# Patient Record
Sex: FEMALE | Race: WHITE | NOT HISPANIC OR LATINO | Employment: OTHER | URBAN - METROPOLITAN AREA
[De-identification: names, ages, dates, MRNs, and addresses within clinical notes are randomized per-mention and may not be internally consistent; named-entity substitution may affect disease eponyms.]

---

## 2023-04-12 ENCOUNTER — OFFICE VISIT (OUTPATIENT)
Dept: PRIMARY CARE | Facility: CLINIC | Age: 68
End: 2023-04-12
Payer: MEDICARE

## 2023-04-12 VITALS
DIASTOLIC BLOOD PRESSURE: 78 MMHG | WEIGHT: 182 LBS | RESPIRATION RATE: 16 BRPM | BODY MASS INDEX: 30.32 KG/M2 | SYSTOLIC BLOOD PRESSURE: 120 MMHG | OXYGEN SATURATION: 97 % | HEART RATE: 60 BPM | HEIGHT: 65 IN

## 2023-04-12 DIAGNOSIS — Z01.818 PREOPERATIVE CLEARANCE: Primary | ICD-10-CM

## 2023-04-12 PROCEDURE — 99213 OFFICE O/P EST LOW 20 MIN: CPT | Performed by: INTERNAL MEDICINE

## 2023-04-12 PROCEDURE — 1036F TOBACCO NON-USER: CPT | Performed by: INTERNAL MEDICINE

## 2023-04-12 PROCEDURE — 1159F MED LIST DOCD IN RCRD: CPT | Performed by: INTERNAL MEDICINE

## 2023-04-12 PROCEDURE — 1160F RVW MEDS BY RX/DR IN RCRD: CPT | Performed by: INTERNAL MEDICINE

## 2023-04-12 RX ORDER — MECLIZINE HYDROCHLORIDE 25 MG/1
TABLET ORAL
COMMUNITY
Start: 2023-02-12 | End: 2023-04-12 | Stop reason: ALTCHOICE

## 2023-04-12 RX ORDER — HYDROCHLOROTHIAZIDE 12.5 MG/1
12.5 CAPSULE ORAL DAILY
COMMUNITY
Start: 2022-08-24 | End: 2023-04-12 | Stop reason: ALTCHOICE

## 2023-04-12 RX ORDER — PAROXETINE HYDROCHLORIDE 20 MG/1
1 TABLET, FILM COATED ORAL DAILY
COMMUNITY
Start: 2022-08-01

## 2023-04-12 RX ORDER — CHOLECALCIFEROL (VITAMIN D3) 125 MCG
5000 CAPSULE ORAL
COMMUNITY
Start: 2019-07-01 | End: 2023-04-12 | Stop reason: ALTCHOICE

## 2023-04-12 RX ORDER — LEVOTHYROXINE SODIUM 50 UG/1
TABLET ORAL
COMMUNITY
Start: 2013-12-05 | End: 2023-04-25

## 2023-04-12 RX ORDER — VALSARTAN 160 MG/1
1 TABLET ORAL DAILY
COMMUNITY
Start: 2022-05-25 | End: 2023-07-20 | Stop reason: SDUPTHER

## 2023-04-12 RX ORDER — FUROSEMIDE 20 MG/1
1 TABLET ORAL DAILY
COMMUNITY
Start: 2022-09-30 | End: 2023-09-25

## 2023-04-12 RX ORDER — CITALOPRAM 20 MG/1
20 TABLET, FILM COATED ORAL
COMMUNITY
Start: 2019-06-06 | End: 2023-04-12 | Stop reason: ALTCHOICE

## 2023-04-12 ASSESSMENT — PATIENT HEALTH QUESTIONNAIRE - PHQ9
2. FEELING DOWN, DEPRESSED OR HOPELESS: NOT AT ALL
1. LITTLE INTEREST OR PLEASURE IN DOING THINGS: NOT AT ALL
SUM OF ALL RESPONSES TO PHQ9 QUESTIONS 1 AND 2: 0

## 2023-04-12 NOTE — PROGRESS NOTES
"Subjective   Patient ID: Karen Cifuentes is a 67 y.o. female who presents for Follow-up. Medical clearance for eyelids     HPI   Patienbt presents for preoperative clearance, eyelid surgery 5/3/23 with Dr. Brooks   No CP, SOB, LE edema  Is able to go up wo flight so stairs without HAWKINS  No previous reactions to anesthesia  Cardiac hx: HTN  Pulm hx: None, never a smoker     Review of Systems   All other systems reviewed and are negative.      Objective   /78   Pulse 60   Ht 1.638 m (5' 4.5\")   Wt 82.6 kg (182 lb)   SpO2 97%   BMI 30.76 kg/m²     Physical Exam  Constitutional:       Appearance: Normal appearance.   HENT:      Head: Normocephalic and atraumatic.   Cardiovascular:      Rate and Rhythm: Normal rate and regular rhythm.      Heart sounds: Normal heart sounds. No murmur heard.     No gallop.   Pulmonary:      Effort: Pulmonary effort is normal. No respiratory distress.      Breath sounds: No wheezing or rales.   Skin:     General: Skin is warm and dry.      Findings: No rash.   Neurological:      Mental Status: She is alert and oriented to person, place, and time. Mental status is at baseline.   Psychiatric:         Mood and Affect: Mood normal.         Behavior: Behavior normal.         Assessment/Plan     #Preoperative clearance   -Patient is cleared for surgery        "

## 2023-06-08 DIAGNOSIS — E03.9 HYPOTHYROIDISM, UNSPECIFIED TYPE: ICD-10-CM

## 2023-06-10 RX ORDER — LEVOTHYROXINE SODIUM 50 UG/1
50 TABLET ORAL
Qty: 90 TABLET | Refills: 1 | Status: SHIPPED | OUTPATIENT
Start: 2023-06-10 | End: 2023-06-14 | Stop reason: ALTCHOICE

## 2023-06-12 DIAGNOSIS — E03.9 HYPOTHYROIDISM, UNSPECIFIED TYPE: ICD-10-CM

## 2023-06-12 LAB
ALANINE AMINOTRANSFERASE (SGPT) (U/L) IN SER/PLAS: 34 U/L (ref 7–45)
ALBUMIN (G/DL) IN SER/PLAS: 4.2 G/DL (ref 3.4–5)
ALKALINE PHOSPHATASE (U/L) IN SER/PLAS: 80 U/L (ref 33–136)
ANION GAP IN SER/PLAS: 12 MMOL/L (ref 10–20)
ASPARTATE AMINOTRANSFERASE (SGOT) (U/L) IN SER/PLAS: 37 U/L (ref 9–39)
BILIRUBIN TOTAL (MG/DL) IN SER/PLAS: 0.5 MG/DL (ref 0–1.2)
CALCIDIOL (25 OH VITAMIN D3) (NG/ML) IN SER/PLAS: 24 NG/ML
CALCIUM (MG/DL) IN SER/PLAS: 9.8 MG/DL (ref 8.6–10.3)
CARBON DIOXIDE, TOTAL (MMOL/L) IN SER/PLAS: 30 MMOL/L (ref 21–32)
CHLORIDE (MMOL/L) IN SER/PLAS: 103 MMOL/L (ref 98–107)
CHOLESTEROL (MG/DL) IN SER/PLAS: 224 MG/DL (ref 0–199)
CHOLESTEROL IN HDL (MG/DL) IN SER/PLAS: 83 MG/DL
CHOLESTEROL/HDL RATIO: 2.7
CREATININE (MG/DL) IN SER/PLAS: 0.98 MG/DL (ref 0.5–1.05)
ERYTHROCYTE DISTRIBUTION WIDTH (RATIO) BY AUTOMATED COUNT: 12.9 % (ref 11.5–14.5)
ERYTHROCYTE MEAN CORPUSCULAR HEMOGLOBIN CONCENTRATION (G/DL) BY AUTOMATED: 32 G/DL (ref 32–36)
ERYTHROCYTE MEAN CORPUSCULAR VOLUME (FL) BY AUTOMATED COUNT: 94 FL (ref 80–100)
ERYTHROCYTES (10*6/UL) IN BLOOD BY AUTOMATED COUNT: 4.54 X10E12/L (ref 4–5.2)
GFR FEMALE: 63 ML/MIN/1.73M2
GLUCOSE (MG/DL) IN SER/PLAS: 83 MG/DL (ref 74–99)
HEMATOCRIT (%) IN BLOOD BY AUTOMATED COUNT: 42.5 % (ref 36–46)
HEMOGLOBIN (G/DL) IN BLOOD: 13.6 G/DL (ref 12–16)
LDL: 127 MG/DL (ref 0–99)
LEUKOCYTES (10*3/UL) IN BLOOD BY AUTOMATED COUNT: 6.6 X10E9/L (ref 4.4–11.3)
PLATELETS (10*3/UL) IN BLOOD AUTOMATED COUNT: 295 X10E9/L (ref 150–450)
POTASSIUM (MMOL/L) IN SER/PLAS: 4.8 MMOL/L (ref 3.5–5.3)
PROTEIN TOTAL: 7.1 G/DL (ref 6.4–8.2)
SODIUM (MMOL/L) IN SER/PLAS: 140 MMOL/L (ref 136–145)
THYROTROPIN (MIU/L) IN SER/PLAS BY DETECTION LIMIT <= 0.05 MIU/L: 4.3 MIU/L (ref 0.44–3.98)
TRIGLYCERIDE (MG/DL) IN SER/PLAS: 69 MG/DL (ref 0–149)
UREA NITROGEN (MG/DL) IN SER/PLAS: 12 MG/DL (ref 6–23)
VLDL: 14 MG/DL (ref 0–40)

## 2023-06-12 NOTE — TELEPHONE ENCOUNTER
Requested Prescriptions     Pending Prescriptions Disp Refills    levothyroxine (Synthroid, Levoxyl) 50 mcg tablet 10 tablet 0     Sig: Take 1 tablet (50 mcg) by mouth once daily in the morning. Take before meals. TAKE ON AN EMPTY STOMACH    Short supply

## 2023-06-14 DIAGNOSIS — E03.9 HYPOTHYROIDISM, UNSPECIFIED TYPE: Primary | ICD-10-CM

## 2023-06-14 RX ORDER — LEVOTHYROXINE SODIUM 50 UG/1
50 TABLET ORAL SEE ADMIN INSTRUCTIONS
Qty: 96 TABLET | Refills: 1 | Status: SHIPPED | OUTPATIENT
Start: 2023-06-14 | End: 2023-12-20

## 2023-06-15 RX ORDER — LEVOTHYROXINE SODIUM 50 UG/1
50 TABLET ORAL
Qty: 10 TABLET | Refills: 0 | OUTPATIENT
Start: 2023-06-15 | End: 2023-09-13

## 2023-07-20 DIAGNOSIS — I10 HYPERTENSION, UNSPECIFIED TYPE: ICD-10-CM

## 2023-07-20 PROBLEM — E78.5 HYPERLIPIDEMIA: Status: ACTIVE | Noted: 2023-07-20

## 2023-07-20 PROBLEM — R35.89 POLYURIA: Status: ACTIVE | Noted: 2023-07-20

## 2023-07-20 PROBLEM — R11.0 NAUSEA IN ADULT: Status: ACTIVE | Noted: 2023-07-20

## 2023-07-20 PROBLEM — N89.8 VAGINAL ITCHING: Status: ACTIVE | Noted: 2023-07-20

## 2023-07-20 PROBLEM — I31.39 PERICARDIAL EFFUSION (HHS-HCC): Status: ACTIVE | Noted: 2018-04-16

## 2023-07-20 PROBLEM — F43.21 ADJUSTMENT DISORDER WITH DEPRESSED MOOD: Status: ACTIVE | Noted: 2023-07-20

## 2023-07-20 PROBLEM — N76.0 VAGINITIS: Status: ACTIVE | Noted: 2023-07-20

## 2023-07-20 PROBLEM — M81.0 POSTMENOPAUSAL BONE LOSS: Status: ACTIVE | Noted: 2023-07-20

## 2023-07-20 PROBLEM — T14.8XXA WOUND INFECTION: Status: ACTIVE | Noted: 2023-07-20

## 2023-07-20 PROBLEM — E55.9 VITAMIN D DEFICIENCY: Status: ACTIVE | Noted: 2023-07-20

## 2023-07-20 PROBLEM — R63.4 UNINTENTIONAL WEIGHT LOSS: Status: ACTIVE | Noted: 2023-07-20

## 2023-07-20 PROBLEM — R92.8 ABNORMAL MAMMOGRAM: Status: ACTIVE | Noted: 2023-07-20

## 2023-07-20 PROBLEM — K59.00 CONSTIPATION: Status: ACTIVE | Noted: 2023-07-20

## 2023-07-20 PROBLEM — I49.9 ARRHYTHMIA: Status: ACTIVE | Noted: 2023-07-20

## 2023-07-20 PROBLEM — E03.9 PRIMARY HYPOTHYROIDISM: Status: ACTIVE | Noted: 2018-04-16

## 2023-07-20 PROBLEM — L08.9 WOUND INFECTION: Status: ACTIVE | Noted: 2023-07-20

## 2023-07-20 PROBLEM — M85.80 OSTEOPENIA: Status: ACTIVE | Noted: 2023-07-20

## 2023-07-20 PROBLEM — I35.8 AORTIC DIASTOLIC MURMUR: Status: ACTIVE | Noted: 2023-07-20

## 2023-07-20 PROBLEM — H93.8X2 SENSATION OF PLUGGED EAR ON LEFT SIDE: Status: ACTIVE | Noted: 2023-07-20

## 2023-07-20 PROBLEM — E83.52 HYPERCALCEMIA: Status: ACTIVE | Noted: 2023-07-20

## 2023-07-20 PROBLEM — F52.0 HYPOACTIVE SEXUAL DESIRE DISORDER: Status: ACTIVE | Noted: 2023-07-20

## 2023-07-20 PROBLEM — N76.2 VULVITIS: Status: ACTIVE | Noted: 2023-07-20

## 2023-07-20 PROBLEM — N95.1 SYMPTOMATIC MENOPAUSAL OR FEMALE CLIMACTERIC STATES: Status: ACTIVE | Noted: 2019-07-01

## 2023-07-20 PROBLEM — N94.10 DYSPAREUNIA IN FEMALE: Status: ACTIVE | Noted: 2023-07-20

## 2023-07-20 PROBLEM — E03.9 HYPOTHYROIDISM (ACQUIRED): Status: ACTIVE | Noted: 2023-07-20

## 2023-07-20 PROBLEM — N95.2 VAGINAL ATROPHY: Status: ACTIVE | Noted: 2023-07-20

## 2023-07-20 PROBLEM — K13.0 ANGULAR CHEILOSIS: Status: ACTIVE | Noted: 2023-07-20

## 2023-07-20 NOTE — TELEPHONE ENCOUNTER
Requested Prescriptions     Pending Prescriptions Disp Refills    valsartan (Diovan) 160 mg tablet 90 tablet 1     Sig: Take 1 tablet (160 mg) by mouth once daily.

## 2023-07-21 RX ORDER — VALSARTAN 160 MG/1
160 TABLET ORAL DAILY
Qty: 90 TABLET | Refills: 1 | Status: SHIPPED | OUTPATIENT
Start: 2023-07-21 | End: 2023-11-09 | Stop reason: SDUPTHER

## 2023-09-25 DIAGNOSIS — I10 BENIGN ESSENTIAL HYPERTENSION: Primary | ICD-10-CM

## 2023-09-25 RX ORDER — FUROSEMIDE 20 MG/1
20 TABLET ORAL DAILY
Qty: 90 TABLET | Refills: 3 | Status: SHIPPED | OUTPATIENT
Start: 2023-09-25 | End: 2023-11-17 | Stop reason: ALTCHOICE

## 2023-11-02 ENCOUNTER — OFFICE VISIT (OUTPATIENT)
Dept: PRIMARY CARE | Facility: CLINIC | Age: 68
End: 2023-11-02
Payer: MEDICARE

## 2023-11-02 VITALS
OXYGEN SATURATION: 95 % | BODY MASS INDEX: 32.95 KG/M2 | HEART RATE: 66 BPM | SYSTOLIC BLOOD PRESSURE: 156 MMHG | DIASTOLIC BLOOD PRESSURE: 83 MMHG | WEIGHT: 195 LBS

## 2023-11-02 DIAGNOSIS — R05.1 ACUTE COUGH: ICD-10-CM

## 2023-11-02 DIAGNOSIS — K21.9 GASTROESOPHAGEAL REFLUX DISEASE, UNSPECIFIED WHETHER ESOPHAGITIS PRESENT: Primary | ICD-10-CM

## 2023-11-02 PROCEDURE — 1159F MED LIST DOCD IN RCRD: CPT | Performed by: INTERNAL MEDICINE

## 2023-11-02 PROCEDURE — 1160F RVW MEDS BY RX/DR IN RCRD: CPT | Performed by: INTERNAL MEDICINE

## 2023-11-02 PROCEDURE — 3079F DIAST BP 80-89 MM HG: CPT | Performed by: INTERNAL MEDICINE

## 2023-11-02 PROCEDURE — 1036F TOBACCO NON-USER: CPT | Performed by: INTERNAL MEDICINE

## 2023-11-02 PROCEDURE — 99213 OFFICE O/P EST LOW 20 MIN: CPT | Performed by: INTERNAL MEDICINE

## 2023-11-02 PROCEDURE — 3077F SYST BP >= 140 MM HG: CPT | Performed by: INTERNAL MEDICINE

## 2023-11-02 RX ORDER — OMEPRAZOLE 40 MG/1
40 CAPSULE, DELAYED RELEASE ORAL
Qty: 30 CAPSULE | Refills: 1 | Status: SHIPPED | OUTPATIENT
Start: 2023-11-02

## 2023-11-02 NOTE — PROGRESS NOTES
Subjective   Patient ID: Karen Cifuentes is a 68 y.o. female who presents for Cough.    HPI     Reports nasal drainage and cough that started a few weeks ago. Took PO steroid which as not helpful   Still has dry cough , but drinage is better  GERD is worse recently, takes Tums recently  No fever or chills       Review of Systems   All other systems reviewed and are negative.      Objective   Wt 88.5 kg (195 lb)   BMI 32.95 kg/m²     Physical Exam  Constitutional:       Appearance: Normal appearance.   HENT:      Head: Normocephalic and atraumatic.   Cardiovascular:      Rate and Rhythm: Normal rate and regular rhythm.      Heart sounds: Normal heart sounds. No murmur heard.     No gallop.   Pulmonary:      Effort: Pulmonary effort is normal. No respiratory distress.      Breath sounds: No wheezing or rales.   Skin:     General: Skin is warm and dry.      Findings: No rash.   Neurological:      Mental Status: She is alert and oriented to person, place, and time. Mental status is at baseline.   Psychiatric:         Mood and Affect: Mood normal.         Behavior: Behavior normal.         Assessment/Plan       #Cough   -Likely due to GERD, possible PND component   -Rx omeprazole 40mg daily   -OTC Flonase for PND component

## 2023-11-02 NOTE — PATIENT INSTRUCTIONS
General: Take omprazole first in am on empty stomach  Can try Flonase as well for post nasal drip component     Call me in 2 weeks if cough not improving

## 2023-11-09 DIAGNOSIS — I10 HYPERTENSION, UNSPECIFIED TYPE: ICD-10-CM

## 2023-11-09 RX ORDER — VALSARTAN 160 MG/1
160 TABLET ORAL DAILY
Qty: 30 TABLET | Refills: 0 | Status: SHIPPED | OUTPATIENT
Start: 2023-11-09 | End: 2024-02-22 | Stop reason: SDUPTHER

## 2023-11-09 NOTE — TELEPHONE ENCOUNTER
Requested Prescriptions     Pending Prescriptions Disp Refills    valsartan (Diovan) 160 mg tablet 30 tablet 0     Sig: Take 1 tablet (160 mg) by mouth once daily. Short day supply

## 2023-11-17 ENCOUNTER — OFFICE VISIT (OUTPATIENT)
Dept: OBSTETRICS AND GYNECOLOGY | Facility: CLINIC | Age: 68
End: 2023-11-17
Payer: MEDICARE

## 2023-11-17 VITALS
DIASTOLIC BLOOD PRESSURE: 80 MMHG | BODY MASS INDEX: 31.82 KG/M2 | HEIGHT: 65 IN | WEIGHT: 191 LBS | SYSTOLIC BLOOD PRESSURE: 158 MMHG

## 2023-11-17 DIAGNOSIS — Z01.419 PAP TEST, AS PART OF ROUTINE GYNECOLOGICAL EXAMINATION: Primary | ICD-10-CM

## 2023-11-17 DIAGNOSIS — Z12.31 BREAST CANCER SCREENING BY MAMMOGRAM: ICD-10-CM

## 2023-11-17 PROCEDURE — 87624 HPV HI-RISK TYP POOLED RSLT: CPT

## 2023-11-17 PROCEDURE — 99214 OFFICE O/P EST MOD 30 MIN: CPT | Performed by: OBSTETRICS & GYNECOLOGY

## 2023-11-17 PROCEDURE — 1159F MED LIST DOCD IN RCRD: CPT | Performed by: OBSTETRICS & GYNECOLOGY

## 2023-11-17 PROCEDURE — 1160F RVW MEDS BY RX/DR IN RCRD: CPT | Performed by: OBSTETRICS & GYNECOLOGY

## 2023-11-17 PROCEDURE — 3079F DIAST BP 80-89 MM HG: CPT | Performed by: OBSTETRICS & GYNECOLOGY

## 2023-11-17 PROCEDURE — 3077F SYST BP >= 140 MM HG: CPT | Performed by: OBSTETRICS & GYNECOLOGY

## 2023-11-17 PROCEDURE — 1036F TOBACCO NON-USER: CPT | Performed by: OBSTETRICS & GYNECOLOGY

## 2023-11-17 PROCEDURE — 1126F AMNT PAIN NOTED NONE PRSNT: CPT | Performed by: OBSTETRICS & GYNECOLOGY

## 2023-11-17 PROCEDURE — 88175 CYTOPATH C/V AUTO FLUID REDO: CPT

## 2023-11-17 ASSESSMENT — ENCOUNTER SYMPTOMS
MUSCULOSKELETAL NEGATIVE: 1
PSYCHIATRIC NEGATIVE: 1
CARDIOVASCULAR NEGATIVE: 1
NEUROLOGICAL NEGATIVE: 1
EYES NEGATIVE: 1
ALLERGIC/IMMUNOLOGIC NEGATIVE: 1
GASTROINTESTINAL NEGATIVE: 1
HEMATOLOGIC/LYMPHATIC NEGATIVE: 1
RESPIRATORY NEGATIVE: 1
ENDOCRINE NEGATIVE: 1
CONSTITUTIONAL NEGATIVE: 1

## 2023-11-17 ASSESSMENT — PAIN SCALES - GENERAL: PAINLEVEL: 0-NO PAIN

## 2023-11-17 NOTE — PROGRESS NOTES
Subjective   Patient ID: Karen Cifuentes is a 68 y.o. female who presents for Annual Exam (Last pap: 7/7/201 NEG/Last mamm: 11/3/2022 NEG CAT 1).  HPI patient is here for annual visit she has no complaints    Review of Systems   Constitutional: Negative.    Eyes: Negative.    Respiratory: Negative.     Cardiovascular: Negative.    Gastrointestinal: Negative.    Endocrine: Negative.    Genitourinary: Negative.    Musculoskeletal: Negative.    Skin: Negative.    Allergic/Immunologic: Negative.    Neurological: Negative.    Hematological: Negative.    Psychiatric/Behavioral: Negative.         Objective   Physical Exam  Constitutional:       Appearance: Normal appearance. She is obese.   HENT:      Head: Normocephalic and atraumatic.   Cardiovascular:      Rate and Rhythm: Normal rate and regular rhythm.      Pulses: Normal pulses.      Heart sounds: Normal heart sounds.   Pulmonary:      Effort: Pulmonary effort is normal.      Breath sounds: Normal breath sounds.   Abdominal:      General: Abdomen is flat. Bowel sounds are normal.      Palpations: Abdomen is soft.      Hernia: There is no hernia in the left inguinal area or right inguinal area.   Genitourinary:     General: Normal vulva.      Exam position: Lithotomy position.      Labia:         Right: No rash, tenderness or lesion.         Left: No rash, tenderness or lesion.       Urethra: No prolapse.      Vagina: Normal.      Cervix: Normal.      Uterus: Normal.       Adnexa: Right adnexa normal and left adnexa normal.   Musculoskeletal:      Cervical back: Normal range of motion and neck supple.   Skin:     General: Skin is warm and dry.   Neurological:      General: No focal deficit present.      Mental Status: She is alert and oriented to person, place, and time.         Assessment/Plan    normal postmenopausal gynecologic examination  Mammogram

## 2023-12-19 DIAGNOSIS — E03.9 HYPOTHYROIDISM, UNSPECIFIED TYPE: ICD-10-CM

## 2023-12-20 RX ORDER — LEVOTHYROXINE SODIUM 50 UG/1
50 TABLET ORAL
Qty: 30 TABLET | Refills: 11 | Status: SHIPPED | OUTPATIENT
Start: 2023-12-20 | End: 2023-12-27 | Stop reason: SDUPTHER

## 2023-12-27 DIAGNOSIS — E03.9 HYPOTHYROIDISM, UNSPECIFIED TYPE: ICD-10-CM

## 2023-12-27 RX ORDER — LEVOTHYROXINE SODIUM 50 UG/1
50 TABLET ORAL
Qty: 90 TABLET | Refills: 0 | Status: SHIPPED | OUTPATIENT
Start: 2023-12-27 | End: 2024-01-18 | Stop reason: SDUPTHER

## 2023-12-27 NOTE — TELEPHONE ENCOUNTER
Requested Prescriptions     Pending Prescriptions Disp Refills    levothyroxine (Synthroid, Levoxyl) 50 mcg tablet 90 tablet 0     Sig: Take 1 tablet (50 mcg) by mouth once daily in the morning. Take before meals. TAKE ON AN EMPTY STOMACH

## 2024-01-18 DIAGNOSIS — E03.9 HYPOTHYROIDISM, UNSPECIFIED TYPE: ICD-10-CM

## 2024-01-19 RX ORDER — LEVOTHYROXINE SODIUM 50 UG/1
50 TABLET ORAL
Qty: 90 TABLET | Refills: 0 | Status: SHIPPED | OUTPATIENT
Start: 2024-01-19 | End: 2024-05-22 | Stop reason: SDUPTHER

## 2024-02-21 ENCOUNTER — HOSPITAL ENCOUNTER (OUTPATIENT)
Dept: RADIOLOGY | Facility: HOSPITAL | Age: 69
Discharge: HOME | End: 2024-02-21
Payer: MEDICARE

## 2024-02-21 DIAGNOSIS — Z12.31 BREAST CANCER SCREENING BY MAMMOGRAM: ICD-10-CM

## 2024-02-21 PROCEDURE — 77067 SCR MAMMO BI INCL CAD: CPT | Performed by: RADIOLOGY

## 2024-02-21 PROCEDURE — 77063 BREAST TOMOSYNTHESIS BI: CPT

## 2024-02-21 PROCEDURE — 77063 BREAST TOMOSYNTHESIS BI: CPT | Performed by: RADIOLOGY

## 2024-02-22 ENCOUNTER — OFFICE VISIT (OUTPATIENT)
Dept: CARDIOLOGY | Facility: HOSPITAL | Age: 69
End: 2024-02-22
Payer: MEDICARE

## 2024-02-22 VITALS
OXYGEN SATURATION: 96 % | SYSTOLIC BLOOD PRESSURE: 173 MMHG | HEART RATE: 78 BPM | BODY MASS INDEX: 27.62 KG/M2 | DIASTOLIC BLOOD PRESSURE: 101 MMHG | WEIGHT: 166.01 LBS

## 2024-02-22 DIAGNOSIS — I10 HYPERTENSION, UNSPECIFIED TYPE: ICD-10-CM

## 2024-02-22 DIAGNOSIS — I10 PRIMARY HYPERTENSION: Primary | ICD-10-CM

## 2024-02-22 PROCEDURE — 3077F SYST BP >= 140 MM HG: CPT | Performed by: NURSE PRACTITIONER

## 2024-02-22 PROCEDURE — 99214 OFFICE O/P EST MOD 30 MIN: CPT | Performed by: NURSE PRACTITIONER

## 2024-02-22 PROCEDURE — 99204 OFFICE O/P NEW MOD 45 MIN: CPT | Performed by: NURSE PRACTITIONER

## 2024-02-22 PROCEDURE — 1159F MED LIST DOCD IN RCRD: CPT | Performed by: NURSE PRACTITIONER

## 2024-02-22 PROCEDURE — 93005 ELECTROCARDIOGRAM TRACING: CPT | Performed by: NURSE PRACTITIONER

## 2024-02-22 PROCEDURE — 1036F TOBACCO NON-USER: CPT | Performed by: NURSE PRACTITIONER

## 2024-02-22 PROCEDURE — 1126F AMNT PAIN NOTED NONE PRSNT: CPT | Performed by: NURSE PRACTITIONER

## 2024-02-22 PROCEDURE — 93010 ELECTROCARDIOGRAM REPORT: CPT | Performed by: INTERNAL MEDICINE

## 2024-02-22 PROCEDURE — 3080F DIAST BP >= 90 MM HG: CPT | Performed by: NURSE PRACTITIONER

## 2024-02-22 RX ORDER — BUPROPION HYDROCHLORIDE 150 MG/1
150 TABLET ORAL DAILY
COMMUNITY

## 2024-02-22 RX ORDER — VALSARTAN 320 MG/1
320 TABLET ORAL DAILY
Qty: 90 TABLET | Refills: 3 | Status: SHIPPED | OUTPATIENT
Start: 2024-02-22 | End: 2024-02-23 | Stop reason: SDUPTHER

## 2024-02-22 RX ORDER — CLONAZEPAM 1 MG/1
1 TABLET ORAL NIGHTLY
COMMUNITY

## 2024-02-22 ASSESSMENT — ENCOUNTER SYMPTOMS: DEPRESSION: 0

## 2024-02-22 NOTE — PROGRESS NOTES
Referred by Dr. Darden for New Patient Visit (Intermittent headaches, mild shortness of breath and elevated blood pressure readings.)     History Of Present Illness:    Karen Cifuentes is a 68 y.o. female with h/o general anxiety disorder, hypertension presenting today to the [facility] for evaluation of uncontrolled hypertension.  She states she felt anxious this past Monday and checked her BP at home with reading that was very elevated despite taking her BP medication.  BP has been elevated to 160-170/ at times.  Denies having chest pain or pressure. Denies SOB.  Has felt quite stressed over the past year with worsening anxiety.  Has been evaluated by psychiatry with medication alterations as well as attending therapy group.  Consumes a low Na+ diet. Has actually lost nearly 30lbs since last fall as well.        Past Medical History:  She has a past medical history of Hypertension.    Past Surgical History:  She has a past surgical history that includes  section, classic (2016) and Other surgical history (2022).      Social History:  She reports that she has never smoked. She has never used smokeless tobacco. She reports current alcohol use. She reports that she does not use drugs.    Family History:  No family history on file.     Allergies:  Aspirin and Hydrochlorothiazide    Outpatient Medications:  Current Outpatient Medications   Medication Instructions    buPROPion XL (WELLBUTRIN XL) 150 mg, oral, Daily, Do not crush, chew, or split.    clonazePAM (KLONOPIN) 1 mg, oral, Nightly    levothyroxine (SYNTHROID, LEVOXYL) 50 mcg, oral, Daily before breakfast, TAKE ON AN EMPTY STOMACH    omeprazole (PRILOSEC) 40 mg, oral, Daily before breakfast, Do not crush or chew.    PARoxetine (Paxil) 20 mg tablet 1 tablet, oral, Daily    valsartan (DIOVAN) 320 mg, oral, Daily, Short day supply        Last Recorded Vitals:  Vitals:    24 1356   BP: (!) 173/101   Pulse:  78   SpO2: 96%   Weight: 75.3 kg (166 lb 0.1 oz)       Physical Exam:  Constitutional: Pleasant, Awake/Alert/Oriented to person place and time. No distress  Head: Atraumatic, Normocephalic  Eyes: EOMI. MAKSIM  Neck:  No JVD  Cardiovascular: Regular rate and rhythm, S1, S2. No extra heart sounds or murmurs  Respiratory: Clear to auscultation bilaterally. No wheezing, rales or rhonchi. Good chest wall expansion  Abdomen: Soft, Nontender. Bowel sounds appreciated  Musculoskeletal: ROM intact. Muscle strength grossly intact upper and lower extremities 5/5.   Neurological: CNII-XII intact. Sensation grossly intact  Extremities: Warm and dry. No acute rashes and lesions  Psychiatric: Appropriate mood and affect         Last Labs:  CBC -  Lab Results   Component Value Date    WBC 6.6 06/12/2023    HGB 13.6 06/12/2023    HCT 42.5 06/12/2023    MCV 94 06/12/2023     06/12/2023       CMP -  Lab Results   Component Value Date    CALCIUM 9.8 06/12/2023    PHOS 3.2 12/01/2021    PROT 7.1 06/12/2023    ALBUMIN 4.2 06/12/2023    AST 37 06/12/2023    ALT 34 06/12/2023    ALKPHOS 80 06/12/2023    BILITOT 0.5 06/12/2023       LIPID PANEL -   Lab Results   Component Value Date    CHOL 224 (H) 06/12/2023    TRIG 69 06/12/2023    HDL 83.0 06/12/2023    CHHDL 2.7 06/12/2023    LDLF 127 (H) 06/12/2023    VLDL 14 06/12/2023       RENAL FUNCTION PANEL -   Lab Results   Component Value Date    GLUCOSE 83 06/12/2023     06/12/2023    K 4.8 06/12/2023     06/12/2023    CO2 30 06/12/2023    ANIONGAP 12 06/12/2023    BUN 12 06/12/2023    CREATININE 0.98 06/12/2023    CALCIUM 9.8 06/12/2023    PHOS 3.2 12/01/2021    ALBUMIN 4.2 06/12/2023        Lab Results   Component Value Date    BNP 21 12/01/2021       Last Cardiology Tests:  ECG:  ECG 12 lead (Clinic Performed) 02/22/2024 (Preliminary)    NSR, HR 73bpm    Echo:  3/30/2016  CONCLUSIONS:   1. The left ventricular systolic function is normal with a 60-65% ejection fraction.    2. The left atrium is normal in size.   3. There is moderate tricuspid regurgitation.   4. There is mild mitral and tricuspid regurgitation.   5. The estimated pulmonary artery pressure is mildly elevated with the RVSP at 37.5 mmHg.   6. There is a small pericardial effusion.    Cath:  3/31/2016  CONCLUSIONS:   1. No significant angiographic CAD in left dominant coronary circulation.   2. LVEDP 22mHg, no evidence of aortic stenosis on LV-Ao gradient.      Lab review: I have personally reviewed the laboratory result(s)   Diagnostic review: I have personally reviewed the result(s) of the Echocardiogram and [facility]     Assessment/Plan   Very pleasant 68 y.o. female with h/o general anxiety disorder, hypertension presenting today to the [facility] for evaluation of uncontrolled hypertension.  Allergic to hydrochlorothiazide.     Plan:  - Increase Valsartan to 320mg daily  - Keep home BP log and bring to follow up appointment  - Follow up in 3-4wks for reassessment of BP and consideration of adding amlodipine if BP remains >140/90       KAYKAY Figueroa-CNP

## 2024-02-23 DIAGNOSIS — I10 HYPERTENSION, UNSPECIFIED TYPE: ICD-10-CM

## 2024-02-23 RX ORDER — VALSARTAN 320 MG/1
320 TABLET ORAL DAILY
Qty: 90 TABLET | Refills: 3 | Status: SHIPPED | OUTPATIENT
Start: 2024-02-23 | End: 2024-02-29 | Stop reason: SDUPTHER

## 2024-02-29 DIAGNOSIS — I10 HYPERTENSION, UNSPECIFIED TYPE: ICD-10-CM

## 2024-02-29 RX ORDER — VALSARTAN 320 MG/1
320 TABLET ORAL DAILY
Qty: 30 TABLET | Refills: 1 | Status: SHIPPED | OUTPATIENT
Start: 2024-02-29 | End: 2024-03-01 | Stop reason: SDUPTHER

## 2024-03-01 DIAGNOSIS — I10 HYPERTENSION, UNSPECIFIED TYPE: ICD-10-CM

## 2024-03-01 RX ORDER — VALSARTAN 320 MG/1
320 TABLET ORAL DAILY
Qty: 90 TABLET | Refills: 1 | Status: SHIPPED | OUTPATIENT
Start: 2024-03-01 | End: 2024-03-14 | Stop reason: ALTCHOICE

## 2024-03-01 RX ORDER — VALSARTAN 320 MG/1
320 TABLET ORAL DAILY
Qty: 30 TABLET | Refills: 0 | Status: SHIPPED | OUTPATIENT
Start: 2024-03-01 | End: 2024-03-14 | Stop reason: ALTCHOICE

## 2024-03-01 NOTE — TELEPHONE ENCOUNTER
Requested Prescriptions     Pending Prescriptions Disp Refills    valsartan (Diovan) 320 mg tablet 30 tablet 0     Sig: Take 1 tablet (320 mg) by mouth once daily. Short day supply    valsartan (Diovan) 320 mg tablet 90 tablet 1     Sig: Take 1 tablet (320 mg) by mouth once daily.

## 2024-03-10 LAB
ATRIAL RATE: 73 BPM
P AXIS: 61 DEGREES
P OFFSET: 197 MS
P ONSET: 139 MS
PR INTERVAL: 170 MS
Q ONSET: 224 MS
QRS COUNT: 12 BEATS
QRS DURATION: 82 MS
QT INTERVAL: 324 MS
QTC CALCULATION(BAZETT): 356 MS
QTC FREDERICIA: 346 MS
R AXIS: -7 DEGREES
T AXIS: 17 DEGREES
T OFFSET: 386 MS
VENTRICULAR RATE: 73 BPM

## 2024-03-14 ENCOUNTER — OFFICE VISIT (OUTPATIENT)
Dept: CARDIOLOGY | Facility: HOSPITAL | Age: 69
End: 2024-03-14
Payer: MEDICARE

## 2024-03-14 VITALS
WEIGHT: 159.39 LBS | BODY MASS INDEX: 26.52 KG/M2 | OXYGEN SATURATION: 98 % | SYSTOLIC BLOOD PRESSURE: 145 MMHG | DIASTOLIC BLOOD PRESSURE: 83 MMHG | HEART RATE: 70 BPM

## 2024-03-14 DIAGNOSIS — I10 HYPERTENSION, UNSPECIFIED TYPE: Primary | ICD-10-CM

## 2024-03-14 PROCEDURE — 1159F MED LIST DOCD IN RCRD: CPT | Performed by: NURSE PRACTITIONER

## 2024-03-14 PROCEDURE — 99214 OFFICE O/P EST MOD 30 MIN: CPT | Performed by: NURSE PRACTITIONER

## 2024-03-14 PROCEDURE — 3077F SYST BP >= 140 MM HG: CPT | Performed by: NURSE PRACTITIONER

## 2024-03-14 PROCEDURE — 1036F TOBACCO NON-USER: CPT | Performed by: NURSE PRACTITIONER

## 2024-03-14 PROCEDURE — 3079F DIAST BP 80-89 MM HG: CPT | Performed by: NURSE PRACTITIONER

## 2024-03-14 RX ORDER — VALSARTAN 320 MG/1
320 TABLET ORAL DAILY
Qty: 90 TABLET | Refills: 3 | Status: SHIPPED | OUTPATIENT
Start: 2024-03-14 | End: 2025-03-14

## 2024-03-14 RX ORDER — AMLODIPINE BESYLATE 5 MG/1
5 TABLET ORAL DAILY
Qty: 30 TABLET | Refills: 11 | Status: SHIPPED | OUTPATIENT
Start: 2024-03-14 | End: 2025-03-14

## 2024-03-15 NOTE — PROGRESS NOTES
Chief Complaint:   BP reassessment      History Of Present Illness:    Karen Cifuentes is a 68 y.o. female from home with h/o generalized anxiety disorder, htn presenting today for follow up and reassessment of BP.  Karen has tolerated increased dose of Valsartan, however, home BP log reviewed and BP remains mostly 140/90's or higher.  She denies chest pain or pressure, SOB, or palpitations.       Last Recorded Vitals:  Vitals:    24 1034   BP: 145/83   Pulse: 70   SpO2: 98%   Weight: 72.3 kg (159 lb 6.3 oz)       Past Medical History:  She has a past medical history of Hypertension.    Past Surgical History:  She has a past surgical history that includes  section, classic (2016) and Other surgical history (2022).      Social History:  She reports that she has never smoked. She has never used smokeless tobacco. She reports current alcohol use. She reports that she does not use drugs.    Family History:  No family history on file.     Allergies:  Aspirin and Hydrochlorothiazide    Outpatient Medications:  Current Outpatient Medications   Medication Instructions    amLODIPine (NORVASC) 5 mg, oral, Daily    buPROPion XL (WELLBUTRIN XL) 150 mg, oral, Daily, Do not crush, chew, or split.    clonazePAM (KLONOPIN) 1 mg, oral, Nightly    levothyroxine (SYNTHROID, LEVOXYL) 50 mcg, oral, Daily before breakfast, TAKE ON AN EMPTY STOMACH    omeprazole (PRILOSEC) 40 mg, oral, Daily before breakfast, Do not crush or chew.    PARoxetine (Paxil) 20 mg tablet 1 tablet, oral, Daily    valsartan (DIOVAN) 320 mg, oral, Daily, Short day supply       Physical Exam:  Constitutional: Pleasant, Awake/Alert/Oriented to person place and time. No distress  Head: Atraumatic, Normocephalic  Eyes: EOMI. MAKSIM  Neck:  No JVD  Cardiovascular: Regular rate and rhythm, S1, S2. No extra heart sounds or murmurs  Respiratory: Clear to auscultation bilaterally. No wheezing, rales or rhonchi. Good chest wall expansion  Abdomen:  Soft, Nontender. Bowel sounds appreciated  Musculoskeletal: ROM intact. Muscle strength grossly intact upper and lower extremities 5/5.   Neurological: CNII-XII intact. Sensation grossly intact  Extremities: Warm and dry. No acute rashes and lesions  Psychiatric: Appropriate mood and affect       Last Labs:  CBC -  Lab Results   Component Value Date    WBC 6.6 06/12/2023    HGB 13.6 06/12/2023    HCT 42.5 06/12/2023    MCV 94 06/12/2023     06/12/2023       CMP -  Lab Results   Component Value Date    CALCIUM 9.8 06/12/2023    PHOS 3.2 12/01/2021    PROT 7.1 06/12/2023    ALBUMIN 4.2 06/12/2023    AST 37 06/12/2023    ALT 34 06/12/2023    ALKPHOS 80 06/12/2023    BILITOT 0.5 06/12/2023       LIPID PANEL -   Lab Results   Component Value Date    CHOL 224 (H) 06/12/2023    TRIG 69 06/12/2023    HDL 83.0 06/12/2023    CHHDL 2.7 06/12/2023    LDLF 127 (H) 06/12/2023    VLDL 14 06/12/2023       RENAL FUNCTION PANEL -   Lab Results   Component Value Date    GLUCOSE 83 06/12/2023     06/12/2023    K 4.8 06/12/2023     06/12/2023    CO2 30 06/12/2023    ANIONGAP 12 06/12/2023    BUN 12 06/12/2023    CREATININE 0.98 06/12/2023    CALCIUM 9.8 06/12/2023    PHOS 3.2 12/01/2021    ALBUMIN 4.2 06/12/2023        Lab Results   Component Value Date    BNP 21 12/01/2021       Last Cardiology Tests:    Echo:  3/30/2016  CONCLUSIONS:   1. The left ventricular systolic function is normal with a 60-65% ejection fraction.   2. The left atrium is normal in size.   3. There is moderate tricuspid regurgitation.   4. There is mild mitral and tricuspid regurgitation.   5. The estimated pulmonary artery pressure is mildly elevated with the RVSP at 37.5 mmHg.   6. There is a small pericardial effusion.    Cath:  3/31/2016  CONCLUSIONS:   1. No significant angiographic CAD in left dominant coronary circulation.   2. LVEDP 22mHg, no evidence of aortic stenosis on LV-Ao gradient.       Lab review: I have personally reviewed the  laboratory result(s)   Diagnostic review: I have personally reviewed the result(s) of the Echocardiogram and [facility]     Assessment/Plan   Very pleasant 68 y.o. female from home with h/o generalized anxiety disorder, htn presenting today for follow up and reassessment of BP.  BP has improved slightly, but still mostly >140/90 at home.      Plan:  -Continue Valsartan 320mg daily   -Add amlodipine 5mg daily  -Follow up in 3 months or sooner if needed       KAYKAY Figueroa-CNP

## 2024-05-20 ENCOUNTER — APPOINTMENT (OUTPATIENT)
Dept: INTEGRATIVE MEDICINE | Facility: CLINIC | Age: 69
End: 2024-05-20
Payer: MEDICARE

## 2024-05-22 DIAGNOSIS — E03.9 HYPOTHYROIDISM, UNSPECIFIED TYPE: ICD-10-CM

## 2024-05-23 RX ORDER — LEVOTHYROXINE SODIUM 50 UG/1
50 TABLET ORAL
Qty: 90 TABLET | Refills: 0 | Status: SHIPPED | OUTPATIENT
Start: 2024-05-23

## 2024-06-20 ENCOUNTER — OFFICE VISIT (OUTPATIENT)
Dept: CARDIOLOGY | Facility: HOSPITAL | Age: 69
End: 2024-06-20
Payer: MEDICARE

## 2024-06-20 VITALS
SYSTOLIC BLOOD PRESSURE: 138 MMHG | HEART RATE: 72 BPM | DIASTOLIC BLOOD PRESSURE: 72 MMHG | WEIGHT: 144.18 LBS | OXYGEN SATURATION: 99 % | BODY MASS INDEX: 23.99 KG/M2

## 2024-06-20 DIAGNOSIS — I10 HYPERTENSION, UNSPECIFIED TYPE: Primary | ICD-10-CM

## 2024-06-20 PROCEDURE — 99214 OFFICE O/P EST MOD 30 MIN: CPT | Performed by: NURSE PRACTITIONER

## 2024-06-20 PROCEDURE — 3075F SYST BP GE 130 - 139MM HG: CPT | Performed by: NURSE PRACTITIONER

## 2024-06-20 PROCEDURE — 1036F TOBACCO NON-USER: CPT | Performed by: NURSE PRACTITIONER

## 2024-06-20 PROCEDURE — 3078F DIAST BP <80 MM HG: CPT | Performed by: NURSE PRACTITIONER

## 2024-06-20 PROCEDURE — 1159F MED LIST DOCD IN RCRD: CPT | Performed by: NURSE PRACTITIONER

## 2024-06-20 NOTE — PROGRESS NOTES
Chief Complaint:   Follow-up (3 mth.)     History Of Present Illness:    Karen Cifuentes is a 68 y.o. female  from home with h/o generalized anxiety disorder, htn presenting for follow up.  Home BP log reviewed for 118//80's, mostly <140/90.  Denies chest pain or pressure, SOB, or palpitations. Has been feeling quite well this summer and very active.       Last Recorded Vitals:  Vitals:    24 1118 24 1214   BP: 163/82 138/72   Pulse: 72    SpO2: 99%    Weight: 65.4 kg (144 lb 2.9 oz)        Past Medical History:  She has a past medical history of Hypertension.    Past Surgical History:  She has a past surgical history that includes  section, classic (2016) and Other surgical history (2022).      Social History:  She reports that she has never smoked. She has never used smokeless tobacco. She reports current alcohol use. She reports that she does not use drugs.    Family History:  No family history on file.     Allergies:  Aspirin and Hydrochlorothiazide    Outpatient Medications:  Current Outpatient Medications   Medication Instructions    amLODIPine (NORVASC) 5 mg, oral, Daily    clonazePAM (KLONOPIN) 1 mg, oral, Nightly    levothyroxine (SYNTHROID, LEVOXYL) 50 mcg, oral, Daily before breakfast, TAKE ON AN EMPTY STOMACH    omeprazole (PRILOSEC) 40 mg, oral, Daily before breakfast, Do not crush or chew.    PARoxetine (Paxil) 20 mg tablet 1 tablet, oral, Daily    valsartan (DIOVAN) 320 mg, oral, Daily, Short day supply       Physical Exam:  Constitutional: Pleasant, Awake/Alert/Oriented to person place and time. No distress  Head: Atraumatic, Normocephalic  Eyes: EOMI. MAKSIM  Neck:  No JVD  Cardiovascular: Regular rate and rhythm, S1, S2. No extra heart sounds or murmurs  Respiratory: Clear to auscultation bilaterally. No wheezing, rales or rhonchi. Good chest wall expansion  Abdomen: Soft, Nontender. Bowel sounds appreciated  Musculoskeletal: ROM intact. Muscle strength grossly  intact upper and lower extremities 5/5.   Neurological: CNII-XII intact. Sensation grossly intact  Extremities: Warm and dry. No acute rashes and lesions  Psychiatric: Appropriate mood and affect       Last Labs:  CBC -  Lab Results   Component Value Date    WBC 6.6 06/12/2023    HGB 13.6 06/12/2023    HCT 42.5 06/12/2023    MCV 94 06/12/2023     06/12/2023       CMP -  Lab Results   Component Value Date    CALCIUM 9.8 06/12/2023    PHOS 3.2 12/01/2021    PROT 7.1 06/12/2023    ALBUMIN 4.2 06/12/2023    AST 37 06/12/2023    ALT 34 06/12/2023    ALKPHOS 80 06/12/2023    BILITOT 0.5 06/12/2023       LIPID PANEL -   Lab Results   Component Value Date    CHOL 224 (H) 06/12/2023    TRIG 69 06/12/2023    HDL 83.0 06/12/2023    CHHDL 2.7 06/12/2023    LDLF 127 (H) 06/12/2023    VLDL 14 06/12/2023       RENAL FUNCTION PANEL -   Lab Results   Component Value Date    GLUCOSE 83 06/12/2023     06/12/2023    K 4.8 06/12/2023     06/12/2023    CO2 30 06/12/2023    ANIONGAP 12 06/12/2023    BUN 12 06/12/2023    CREATININE 0.98 06/12/2023    CALCIUM 9.8 06/12/2023    PHOS 3.2 12/01/2021    ALBUMIN 4.2 06/12/2023        Lab Results   Component Value Date    BNP 21 12/01/2021       Last Cardiology Tests:  Echo:  3/30/2016  CONCLUSIONS:   1. The left ventricular systolic function is normal with a 60-65% ejection fraction.   2. The left atrium is normal in size.   3. There is moderate tricuspid regurgitation.   4. There is mild mitral and tricuspid regurgitation.   5. The estimated pulmonary artery pressure is mildly elevated with the RVSP at 37.5 mmHg.   6. There is a small pericardial effusion.    Cath:  3/31/2016  CONCLUSIONS:   1. No significant angiographic CAD in left dominant coronary circulation.   2. LVEDP 22mHg, no evidence of aortic stenosis on LV-Ao gradient.    Lab review: I have personally reviewed the laboratory result(s)   Diagnostic review: I have personally reviewed the result(s) of the  Echocardiogram and [facility]     Assessment/Plan   Very pleasant 68 y.o. female  from home with h/o generalized anxiety disorder, htn presenting for follow up.  Home BP log reviewed for 118//80's, mostly <140/90.  Denies chest pain or pressure, SOB, or palpitations. Has been feeling quite well this summer and very active.      Plan:  -Continue amlodipine 5mg daily and valsartan 320mg daily  -Follow up in 6 months or sooner if needed    KAYKAY Figueroa-CNP

## 2024-08-13 DIAGNOSIS — E03.9 HYPOTHYROIDISM, UNSPECIFIED TYPE: ICD-10-CM

## 2024-08-14 RX ORDER — LEVOTHYROXINE SODIUM 50 UG/1
50 TABLET ORAL
Qty: 90 TABLET | Refills: 0 | Status: SHIPPED | OUTPATIENT
Start: 2024-08-14

## 2024-11-09 DIAGNOSIS — E03.9 HYPOTHYROIDISM, UNSPECIFIED TYPE: ICD-10-CM

## 2024-11-12 RX ORDER — LEVOTHYROXINE SODIUM 50 UG/1
50 TABLET ORAL
Qty: 30 TABLET | Refills: 0 | Status: SHIPPED | OUTPATIENT
Start: 2024-11-12

## 2024-12-16 ENCOUNTER — APPOINTMENT (OUTPATIENT)
Dept: PRIMARY CARE | Facility: CLINIC | Age: 69
End: 2024-12-16
Payer: MEDICARE

## 2024-12-16 VITALS
WEIGHT: 145 LBS | OXYGEN SATURATION: 98 % | DIASTOLIC BLOOD PRESSURE: 88 MMHG | SYSTOLIC BLOOD PRESSURE: 143 MMHG | BODY MASS INDEX: 24.13 KG/M2 | HEART RATE: 63 BPM

## 2024-12-16 DIAGNOSIS — E78.5 HYPERLIPIDEMIA, UNSPECIFIED HYPERLIPIDEMIA TYPE: ICD-10-CM

## 2024-12-16 DIAGNOSIS — E03.9 HYPOTHYROIDISM (ACQUIRED): ICD-10-CM

## 2024-12-16 DIAGNOSIS — F41.1 GENERALIZED ANXIETY DISORDER: ICD-10-CM

## 2024-12-16 DIAGNOSIS — M85.80 OSTEOPENIA, UNSPECIFIED LOCATION: ICD-10-CM

## 2024-12-16 DIAGNOSIS — Z12.31 SCREENING MAMMOGRAM FOR BREAST CANCER: ICD-10-CM

## 2024-12-16 DIAGNOSIS — I10 BENIGN ESSENTIAL HYPERTENSION: Primary | ICD-10-CM

## 2024-12-16 PROCEDURE — 3077F SYST BP >= 140 MM HG: CPT | Performed by: INTERNAL MEDICINE

## 2024-12-16 PROCEDURE — 1036F TOBACCO NON-USER: CPT | Performed by: INTERNAL MEDICINE

## 2024-12-16 PROCEDURE — 3079F DIAST BP 80-89 MM HG: CPT | Performed by: INTERNAL MEDICINE

## 2024-12-16 PROCEDURE — 1159F MED LIST DOCD IN RCRD: CPT | Performed by: INTERNAL MEDICINE

## 2024-12-16 PROCEDURE — 1126F AMNT PAIN NOTED NONE PRSNT: CPT | Performed by: INTERNAL MEDICINE

## 2024-12-16 PROCEDURE — 99214 OFFICE O/P EST MOD 30 MIN: CPT | Performed by: INTERNAL MEDICINE

## 2024-12-16 RX ORDER — GABAPENTIN 800 MG/1
1 TABLET ORAL DAILY
COMMUNITY

## 2024-12-16 ASSESSMENT — PATIENT HEALTH QUESTIONNAIRE - PHQ9
2. FEELING DOWN, DEPRESSED OR HOPELESS: NOT AT ALL
SUM OF ALL RESPONSES TO PHQ9 QUESTIONS 1 AND 2: 0
1. LITTLE INTEREST OR PLEASURE IN DOING THINGS: NOT AT ALL

## 2024-12-16 ASSESSMENT — ENCOUNTER SYMPTOMS
LOSS OF SENSATION IN FEET: 0
OCCASIONAL FEELINGS OF UNSTEADINESS: 0
DEPRESSION: 0

## 2024-12-16 ASSESSMENT — PAIN SCALES - GENERAL: PAINLEVEL_OUTOF10: 0-NO PAIN

## 2024-12-16 ASSESSMENT — COLUMBIA-SUICIDE SEVERITY RATING SCALE - C-SSRS
2. HAVE YOU ACTUALLY HAD ANY THOUGHTS OF KILLING YOURSELF?: NO
1. IN THE PAST MONTH, HAVE YOU WISHED YOU WERE DEAD OR WISHED YOU COULD GO TO SLEEP AND NOT WAKE UP?: NO
6. HAVE YOU EVER DONE ANYTHING, STARTED TO DO ANYTHING, OR PREPARED TO DO ANYTHING TO END YOUR LIFE?: NO

## 2024-12-16 NOTE — PROGRESS NOTES
Subjective   Patient ID: Karen Cifuentes is a 69 y.o. female who presents for Annual Exam.    HPI     Repots anxiety and depression was bad until about 3 weeks ago. Follows with Dr. Muñiz (psychiatry). Is doing much better. Taking gabapentin and clonazepam.    No other new issues   Review of Systems   All other systems reviewed and are negative.      Objective   Wt 65.8 kg (145 lb)   BMI 24.13 kg/m²     Physical Exam  Constitutional:       Appearance: Normal appearance.   HENT:      Head: Normocephalic and atraumatic.   Cardiovascular:      Rate and Rhythm: Normal rate and regular rhythm.      Heart sounds: Normal heart sounds. No murmur heard.     No gallop.   Pulmonary:      Effort: Pulmonary effort is normal. No respiratory distress.      Breath sounds: No wheezing or rales.   Skin:     General: Skin is warm and dry.      Findings: No rash.   Neurological:      Mental Status: She is alert and oriented to person, place, and time. Mental status is at baseline.   Psychiatric:         Mood and Affect: Mood normal.         Behavior: Behavior normal.         Assessment/Plan       #HTN  -Well controlled. Follows with cardiology. Cont valsartan 320mg daily and amlodipine 5mg daily   -Check CBC, CMP and lipid panel     #Hypothyroidism   -Cont levothyroxine 50mcg daily. Check TSH     #Anxiety and depression   -Following with psych. Cont current med regimen     Recommended   Mammogram: 2/21/24--ordered today   DEXA: 8/2021--osteopenia--ordered today   CRC: 12/31/20- 10 years

## 2024-12-16 NOTE — PATIENT INSTRUCTIONS
Please complete fasting blood work. Fast for 10 hours, black coffee and water the morning of labs are OK.     Mamm and dexa (due 2/22/25 feb or after) [phone]    Prevnar 20 and RSV vaccines     1 year

## 2024-12-19 ENCOUNTER — OFFICE VISIT (OUTPATIENT)
Dept: CARDIOLOGY | Facility: HOSPITAL | Age: 69
End: 2024-12-19
Payer: MEDICARE

## 2024-12-19 VITALS
WEIGHT: 151.01 LBS | SYSTOLIC BLOOD PRESSURE: 133 MMHG | HEART RATE: 66 BPM | DIASTOLIC BLOOD PRESSURE: 61 MMHG | OXYGEN SATURATION: 99 % | BODY MASS INDEX: 25.13 KG/M2

## 2024-12-19 DIAGNOSIS — I10 HYPERTENSION, UNSPECIFIED TYPE: Primary | ICD-10-CM

## 2024-12-19 PROCEDURE — 3078F DIAST BP <80 MM HG: CPT | Performed by: NURSE PRACTITIONER

## 2024-12-19 PROCEDURE — 99214 OFFICE O/P EST MOD 30 MIN: CPT | Performed by: NURSE PRACTITIONER

## 2024-12-19 PROCEDURE — 1159F MED LIST DOCD IN RCRD: CPT | Performed by: NURSE PRACTITIONER

## 2024-12-19 PROCEDURE — 3075F SYST BP GE 130 - 139MM HG: CPT | Performed by: NURSE PRACTITIONER

## 2024-12-19 NOTE — PROGRESS NOTES
Chief Complaint:   Follow up      History Of Present Illness:    Karen Cifuentes is a 69 y.o. female from home with h/o generalized anxiety disorder, htn presenting for follow up.  She is doing very well from a cardiac standpoint.  Has been working with a new psychiatrist as well as a new therapist with significant improvement in anxiety and depression. Denies chest pain, SOB, palpitations.  BP is well controlled.      Last Recorded Vitals:  Vitals:    24 1106   BP: 133/61   Pulse: 66   SpO2: 99%   Weight: 68.5 kg (151 lb 0.2 oz)       Past Medical History:  She has a past medical history of Hypertension.    Past Surgical History:  She has a past surgical history that includes  section, classic (2016) and Other surgical history (2022).      Social History:  She reports that she has never smoked. She has never used smokeless tobacco. She reports current alcohol use. She reports that she does not use drugs.    Family History:  No family history on file.     Allergies:  Aspirin and Hydrochlorothiazide    Outpatient Medications:  Current Outpatient Medications   Medication Instructions    amLODIPine (NORVASC) 5 mg, oral, Daily    clonazePAM (KLONOPIN) 1 mg, Nightly    gabapentin (Neurontin) 800 mg tablet 1 tablet, Daily    levothyroxine (SYNTHROID, LEVOXYL) 50 mcg, oral, Daily before breakfast, TAKE ON AN EMPTY STOMACH    valsartan (DIOVAN) 320 mg, oral, Daily, Short day supply       Physical Exam:  Constitutional: Pleasant, Awake/Alert/Oriented to person place and time  Head: Atraumatic, Normocephalic  Eyes: EOMI. MAKSIM  Neck: No JVD  Cardiovascular: Regular rate and rhythm, S1, S2. No extra heart sounds or murmurs  Respiratory: Clear to auscultation bilaterally. No wheezing, rales or rhonchi.   Abdomen: Soft, Nontender. Bowel sounds appreciated  Musculoskeletal: ROM intact. Muscle strength grossly intact upper and lower extremities 5/5.   Neurological: CNII-XII intact. Sensation grossly  intact  Extremities: Warm and dry. No acute rashes and lesions  Psychiatric: Appropriate mood and affect       Last Labs:  CBC -  Lab Results   Component Value Date    WBC 6.6 06/12/2023    HGB 13.6 06/12/2023    HCT 42.5 06/12/2023    MCV 94 06/12/2023     06/12/2023       CMP -  Lab Results   Component Value Date    CALCIUM 9.8 06/12/2023    PHOS 3.2 12/01/2021    PROT 7.1 06/12/2023    ALBUMIN 4.2 06/12/2023    AST 37 06/12/2023    ALT 34 06/12/2023    ALKPHOS 80 06/12/2023    BILITOT 0.5 06/12/2023       LIPID PANEL -   Lab Results   Component Value Date    CHOL 224 (H) 06/12/2023    TRIG 69 06/12/2023    HDL 83.0 06/12/2023    CHHDL 2.7 06/12/2023    LDLF 127 (H) 06/12/2023    VLDL 14 06/12/2023       RENAL FUNCTION PANEL -   Lab Results   Component Value Date    GLUCOSE 83 06/12/2023     06/12/2023    K 4.8 06/12/2023     06/12/2023    CO2 30 06/12/2023    ANIONGAP 12 06/12/2023    BUN 12 06/12/2023    CREATININE 0.98 06/12/2023    CALCIUM 9.8 06/12/2023    PHOS 3.2 12/01/2021    ALBUMIN 4.2 06/12/2023        Lab Results   Component Value Date    BNP 21 12/01/2021       Last Cardiology Tests:  ECG:  ECG today: NSR, HR 65bpm     Echo:  3/30/2016  CONCLUSIONS:   1. The left ventricular systolic function is normal with a 60-65% ejection fraction.   2. The left atrium is normal in size.   3. There is moderate tricuspid regurgitation.   4. There is mild mitral and tricuspid regurgitation.   5. The estimated pulmonary artery pressure is mildly elevated with the RVSP at 37.5 mmHg.   6. There is a small pericardial effusion.     Cath:  3/31/2016  CONCLUSIONS:   1. No significant angiographic CAD in left dominant coronary circulation.   2. LVEDP 22mHg, no evidence of aortic stenosis on LV-Ao gradient.     Lab review: I have personally reviewed the laboratory result(s)   Diagnostic review: I have personally reviewed the result(s) of the Echocardiogram and C           Assessment/Plan   Very pleasant  69 y.o. female from home with h/o generalized anxiety disorder, htn presenting for follow up.  She is doing very well from a cardiac standpoint.     Plan:  -Continue current amlodipine 5mg daily and valsartan 320mg daily  -Follow up in 6 months or sooner if needed       KAYKAY Figueroa-CNP

## 2025-01-23 DIAGNOSIS — I10 HYPERTENSION, UNSPECIFIED TYPE: ICD-10-CM

## 2025-01-24 RX ORDER — AMLODIPINE BESYLATE 5 MG/1
5 TABLET ORAL DAILY
Qty: 90 TABLET | Refills: 3 | Status: SHIPPED | OUTPATIENT
Start: 2025-01-24

## 2025-02-12 DIAGNOSIS — E78.5 HYPERLIPIDEMIA, UNSPECIFIED HYPERLIPIDEMIA TYPE: Primary | ICD-10-CM

## 2025-02-12 LAB
25(OH)D3+25(OH)D2 SERPL-MCNC: 33 NG/ML (ref 30–100)
ALBUMIN SERPL-MCNC: 4.4 G/DL (ref 3.6–5.1)
ALP SERPL-CCNC: 83 U/L (ref 37–153)
ALT SERPL-CCNC: 27 U/L (ref 6–29)
ANION GAP SERPL CALCULATED.4IONS-SCNC: 8 MMOL/L (CALC) (ref 7–17)
AST SERPL-CCNC: 26 U/L (ref 10–35)
BILIRUB SERPL-MCNC: 0.3 MG/DL (ref 0.2–1.2)
BUN SERPL-MCNC: 17 MG/DL (ref 7–25)
CALCIUM SERPL-MCNC: 9.4 MG/DL (ref 8.6–10.4)
CHLORIDE SERPL-SCNC: 103 MMOL/L (ref 98–110)
CHOLEST SERPL-MCNC: 269 MG/DL
CHOLEST/HDLC SERPL: 2.5 (CALC)
CO2 SERPL-SCNC: 28 MMOL/L (ref 20–32)
CREAT SERPL-MCNC: 0.98 MG/DL (ref 0.5–1.05)
EGFRCR SERPLBLD CKD-EPI 2021: 63 ML/MIN/1.73M2
ERYTHROCYTE [DISTWIDTH] IN BLOOD BY AUTOMATED COUNT: 12.8 % (ref 11–15)
GLUCOSE SERPL-MCNC: 93 MG/DL (ref 65–99)
HCT VFR BLD AUTO: 40.3 % (ref 35–45)
HDLC SERPL-MCNC: 106 MG/DL
HGB BLD-MCNC: 13 G/DL (ref 11.7–15.5)
LDLC SERPL CALC-MCNC: 145 MG/DL (CALC)
MCH RBC QN AUTO: 31 PG (ref 27–33)
MCHC RBC AUTO-ENTMCNC: 32.3 G/DL (ref 32–36)
MCV RBC AUTO: 96.2 FL (ref 80–100)
NONHDLC SERPL-MCNC: 163 MG/DL (CALC)
PLATELET # BLD AUTO: 270 THOUSAND/UL (ref 140–400)
PMV BLD REES-ECKER: 10.1 FL (ref 7.5–12.5)
POTASSIUM SERPL-SCNC: 4.2 MMOL/L (ref 3.5–5.3)
PROT SERPL-MCNC: 6.8 G/DL (ref 6.1–8.1)
RBC # BLD AUTO: 4.19 MILLION/UL (ref 3.8–5.1)
SODIUM SERPL-SCNC: 139 MMOL/L (ref 135–146)
TRIGL SERPL-MCNC: 76 MG/DL
TSH SERPL-ACNC: 3.19 MIU/L (ref 0.4–4.5)
WBC # BLD AUTO: 6 THOUSAND/UL (ref 3.8–10.8)

## 2025-03-04 ENCOUNTER — HOSPITAL ENCOUNTER (OUTPATIENT)
Dept: RADIOLOGY | Facility: HOSPITAL | Age: 70
Discharge: HOME | End: 2025-03-04
Payer: MEDICARE

## 2025-03-04 DIAGNOSIS — E78.5 HYPERLIPIDEMIA, UNSPECIFIED HYPERLIPIDEMIA TYPE: ICD-10-CM

## 2025-03-04 PROCEDURE — 75571 CT HRT W/O DYE W/CA TEST: CPT

## 2025-03-05 DIAGNOSIS — I31.39 PERICARDIAL EFFUSION (HHS-HCC): Primary | ICD-10-CM

## 2025-03-10 DIAGNOSIS — E03.9 HYPOTHYROIDISM, UNSPECIFIED TYPE: ICD-10-CM

## 2025-03-10 RX ORDER — LEVOTHYROXINE SODIUM 50 UG/1
50 TABLET ORAL
Qty: 90 TABLET | Refills: 1 | Status: SHIPPED | OUTPATIENT
Start: 2025-03-10

## 2025-03-31 DIAGNOSIS — I10 HYPERTENSION, UNSPECIFIED TYPE: ICD-10-CM

## 2025-03-31 RX ORDER — VALSARTAN 320 MG/1
320 TABLET ORAL DAILY
Qty: 90 TABLET | Refills: 3 | Status: SHIPPED | OUTPATIENT
Start: 2025-03-31 | End: 2026-03-31

## 2025-06-19 ENCOUNTER — APPOINTMENT (OUTPATIENT)
Dept: CARDIOLOGY | Facility: HOSPITAL | Age: 70
End: 2025-06-19
Payer: MEDICARE

## 2025-06-20 ENCOUNTER — APPOINTMENT (OUTPATIENT)
Dept: PRIMARY CARE | Facility: CLINIC | Age: 70
End: 2025-06-20
Payer: MEDICARE

## 2025-06-20 VITALS
HEART RATE: 67 BPM | HEIGHT: 65 IN | BODY MASS INDEX: 29.82 KG/M2 | SYSTOLIC BLOOD PRESSURE: 130 MMHG | OXYGEN SATURATION: 93 % | WEIGHT: 179 LBS | DIASTOLIC BLOOD PRESSURE: 83 MMHG

## 2025-06-20 DIAGNOSIS — M85.80 OSTEOPENIA, UNSPECIFIED LOCATION: ICD-10-CM

## 2025-06-20 DIAGNOSIS — H43.12 VITREOUS HEMORRHAGE, LEFT EYE (MULTI): ICD-10-CM

## 2025-06-20 DIAGNOSIS — I10 BENIGN ESSENTIAL HYPERTENSION: ICD-10-CM

## 2025-06-20 DIAGNOSIS — E03.9 HYPOTHYROIDISM (ACQUIRED): ICD-10-CM

## 2025-06-20 DIAGNOSIS — Z00.00 MEDICARE ANNUAL WELLNESS VISIT, SUBSEQUENT: Primary | ICD-10-CM

## 2025-06-20 DIAGNOSIS — E78.5 HYPERLIPIDEMIA, UNSPECIFIED HYPERLIPIDEMIA TYPE: ICD-10-CM

## 2025-06-20 RX ORDER — ATORVASTATIN CALCIUM 10 MG/1
10 TABLET, FILM COATED ORAL DAILY
Qty: 100 TABLET | Refills: 3 | Status: SHIPPED | OUTPATIENT
Start: 2025-06-20 | End: 2026-07-25

## 2025-06-20 RX ORDER — PAROXETINE 40 MG/1
1 TABLET, FILM COATED ORAL
COMMUNITY
Start: 2025-06-03

## 2025-06-20 ASSESSMENT — ACTIVITIES OF DAILY LIVING (ADL)
MANAGING_FINANCES: INDEPENDENT
DRESSING: INDEPENDENT
TAKING_MEDICATION: INDEPENDENT
BATHING: INDEPENDENT
GROCERY_SHOPPING: INDEPENDENT
DOING_HOUSEWORK: INDEPENDENT

## 2025-06-20 ASSESSMENT — PATIENT HEALTH QUESTIONNAIRE - PHQ9
1. LITTLE INTEREST OR PLEASURE IN DOING THINGS: NOT AT ALL
SUM OF ALL RESPONSES TO PHQ9 QUESTIONS 1 AND 2: 0
2. FEELING DOWN, DEPRESSED OR HOPELESS: NOT AT ALL

## 2025-06-20 ASSESSMENT — ENCOUNTER SYMPTOMS
OCCASIONAL FEELINGS OF UNSTEADINESS: 0
LOSS OF SENSATION IN FEET: 0
DEPRESSION: 0

## 2025-06-20 NOTE — PATIENT INSTRUCTIONS
Use compression stockings, elevation & physical exercise.    Get mammogram and DEXA.  [phone]    Get lipid panel before next apt.    Please complete fasting blood work. Fast for 10 hours, black coffee and water the morning of labs are OK.   [facility]  [address]  [address] [place] [id number]  Phone: [phone]  M-F  7:30-5  Sat  8-12

## 2025-06-20 NOTE — PROGRESS NOTES
"Subjective   Patient ID: Karen Cifuentes is a 69 y.o. female who presents for Follow-up and Medicare Annual Wellness Visit Subsequent.    HPI   Patient is here for follow up.    She reports her legs have been swelling recently. Swelling is more prominent at the end of the day    Otherwise doing well  No other concerns   No CP, SOB, HAWKINS.      Objective   /83   Pulse 67   Ht 1.651 m (5' 5\")   Wt 81.2 kg (179 lb)   SpO2 93%   BMI 29.79 kg/m²     Physical Exam  Constitutional:       General: She is not in acute distress.  Cardiovascular:      Rate and Rhythm: Normal rate.      Heart sounds:      No friction rub. No gallop.   Pulmonary:      Effort: Pulmonary effort is normal. No respiratory distress.   Musculoskeletal:      Right lower leg: Edema present.      Left lower leg: Edema present.      Comments: +1-2 SAMANTHA   Neurological:      Mental Status: She is alert. Mental status is at baseline.         Assessment/Plan       #LE Edema  -Advised compression stockings, elevation & physical exercise  -Possibly 2/2 amlodipine    #HTN  -Well controlled.   -Follows with cardiology. Apt at end of this month. Pending TTE.  -Cont valsartan 320mg daily and amlodipine 5mg daily   -Check CBC, CMP and lipid panel     #HLD  -,   -CAC 0  -Start atorvastatin 20mg every day    #Hypothyroidism   -TSH wnl  -Cont levothyroxine 50mcg daily.     #Anxiety and depression   -Following with psych. Well controlled.  -Cont current med regimen (gabapentin, Paxil, klonopin prn)    #Osteopenia  -Repeat DEXA ordered, not yet complete       Influenza: UTD  COVID: x3  Shingrix: x2  Mamm: Mammogram: 2/21/24--ordered   DEXA: 8/2021--osteopenia- repeat ordered last visit  Pap: NI  CRC: 12/31/20- 10 years       Jamil Michael DO, PGY-1  Internal Medicine   6/20/25 at 3:24 PM.     "

## 2025-06-28 PROBLEM — H43.12 VITREOUS HEMORRHAGE, LEFT EYE (MULTI): Status: ACTIVE | Noted: 2025-06-28

## 2025-07-01 ENCOUNTER — APPOINTMENT (OUTPATIENT)
Dept: CARDIOLOGY | Facility: HOSPITAL | Age: 70
End: 2025-07-01
Payer: MEDICARE

## 2025-07-08 LAB
CHOLEST SERPL-MCNC: 242 MG/DL
CHOLEST/HDLC SERPL: 2.7 (CALC)
HDLC SERPL-MCNC: 90 MG/DL
LDLC SERPL CALC-MCNC: 137 MG/DL (CALC)
NONHDLC SERPL-MCNC: 152 MG/DL (CALC)
TRIGL SERPL-MCNC: 59 MG/DL

## 2025-07-10 ENCOUNTER — OFFICE VISIT (OUTPATIENT)
Dept: CARDIOLOGY | Facility: HOSPITAL | Age: 70
End: 2025-07-10
Payer: MEDICARE

## 2025-07-10 VITALS
OXYGEN SATURATION: 93 % | HEART RATE: 67 BPM | SYSTOLIC BLOOD PRESSURE: 128 MMHG | DIASTOLIC BLOOD PRESSURE: 71 MMHG | BODY MASS INDEX: 31.37 KG/M2 | WEIGHT: 188.49 LBS

## 2025-07-10 DIAGNOSIS — I10 PRIMARY HYPERTENSION: ICD-10-CM

## 2025-07-10 DIAGNOSIS — I10 HYPERTENSION, UNSPECIFIED TYPE: ICD-10-CM

## 2025-07-10 LAB
ATRIAL RATE: 64 BPM
P AXIS: 61 DEGREES
P OFFSET: 191 MS
P ONSET: 127 MS
PR INTERVAL: 196 MS
Q ONSET: 225 MS
QRS COUNT: 10 BEATS
QRS DURATION: 76 MS
QT INTERVAL: 388 MS
QTC CALCULATION(BAZETT): 400 MS
QTC FREDERICIA: 396 MS
R AXIS: 7 DEGREES
T AXIS: 38 DEGREES
T OFFSET: 419 MS
VENTRICULAR RATE: 64 BPM

## 2025-07-10 PROCEDURE — 99212 OFFICE O/P EST SF 10 MIN: CPT

## 2025-07-10 PROCEDURE — 1159F MED LIST DOCD IN RCRD: CPT | Performed by: NURSE PRACTITIONER

## 2025-07-10 PROCEDURE — 3074F SYST BP LT 130 MM HG: CPT | Performed by: NURSE PRACTITIONER

## 2025-07-10 PROCEDURE — 93005 ELECTROCARDIOGRAM TRACING: CPT | Performed by: NURSE PRACTITIONER

## 2025-07-10 PROCEDURE — 99214 OFFICE O/P EST MOD 30 MIN: CPT | Performed by: NURSE PRACTITIONER

## 2025-07-10 PROCEDURE — 3078F DIAST BP <80 MM HG: CPT | Performed by: NURSE PRACTITIONER

## 2025-07-10 RX ORDER — AMLODIPINE BESYLATE 5 MG/1
2.5 TABLET ORAL DAILY
Start: 2025-07-10

## 2025-07-10 NOTE — PROGRESS NOTES
Chief Complaint:   Follow up       History Of Present Illness:    Karen Cifuentes is a 69 y.o. female from home with h/o generalized anxiety disorder, htn presenting for follow up. She is doing very well from a cardiac standpoint.  Denies chest pain or pressure, SOB. Has noted mild LE edema over the past couple months--seemingly related to warm weather, however, she was concerned it may be a side effect from amlodipine so she stopped it 3 days ago.      Last Recorded Vitals:  Vitals:    07/10/25 1401   BP: 128/71   Pulse: 67   SpO2: 93%   Weight: 85.5 kg (188 lb 7.9 oz)       Past Medical History:  She has a past medical history of Hypertension.    Past Surgical History:  She has a past surgical history that includes  section, classic (2016) and Other surgical history (2022).      Social History:  She reports that she has never smoked. She has never used smokeless tobacco. She reports current alcohol use. She reports that she does not use drugs.    Family History:  Family History[1]     Allergies:  Aspirin, Hydrochlorothiazide, and Lisinopril    Outpatient Medications:  Current Outpatient Medications   Medication Instructions    amLODIPine (NORVASC) 2.5 mg, oral, Daily    atorvastatin (LIPITOR) 10 mg, oral, Daily    clonazePAM (KLONOPIN) 1 mg, Nightly    gabapentin (Neurontin) 800 mg tablet 1 tablet, Daily    levothyroxine (SYNTHROID, LEVOXYL) 50 mcg, oral, Daily before breakfast, TAKE ON AN EMPTY STOMACH    PARoxetine (Paxil) 40 mg tablet 1 tablet, Daily (0630)    valsartan (DIOVAN) 320 mg, oral, Daily, Short day supply       Physical Exam:  Constitutional: Pleasant, Awake/Alert/Oriented to person place and time.   Head: Atraumatic, Normocephalic  Eyes: EOMI. MAKSIM  Neck: No JVD  Cardiovascular: Regular rate and rhythm, S1, S2. No extra heart sounds or murmurs  Respiratory: Clear to auscultation bilaterally. No wheezing, rales or rhonchi.   Abdomen: Soft, Nontender. Bowel sounds  appreciated  Musculoskeletal: ROM intact. Muscle strength grossly intact upper and lower extremities 5/5.   Neurological: CNII-XII intact. Sensation grossly intact  Extremities: Warm and dry. No acute rashes and lesions  Psychiatric: Appropriate mood and affect       Last Labs:  CBC -  Lab Results   Component Value Date    WBC 6.0 02/11/2025    HGB 13.0 02/11/2025    HCT 40.3 02/11/2025    MCV 96.2 02/11/2025     02/11/2025       CMP -  Lab Results   Component Value Date    CALCIUM 9.4 02/11/2025    PHOS 3.2 12/01/2021    PROT 6.8 02/11/2025    ALBUMIN 4.4 02/11/2025    AST 26 02/11/2025    ALT 27 02/11/2025    ALKPHOS 83 02/11/2025    BILITOT 0.3 02/11/2025       LIPID PANEL -   Lab Results   Component Value Date    CHOL 242 (H) 07/08/2025    TRIG 59 07/08/2025    HDL 90 07/08/2025    CHHDL 2.7 07/08/2025    LDLF 127 (H) 06/12/2023    VLDL 14 06/12/2023    NHDL 152 (H) 07/08/2025       RENAL FUNCTION PANEL -   Lab Results   Component Value Date    GLUCOSE 93 02/11/2025     02/11/2025    K 4.2 02/11/2025     02/11/2025    CO2 28 02/11/2025    ANIONGAP 8 02/11/2025    BUN 17 02/11/2025    CREATININE 0.98 02/11/2025    CALCIUM 9.4 02/11/2025    PHOS 3.2 12/01/2021    ALBUMIN 4.4 02/11/2025        Lab Results   Component Value Date    BNP 21 12/01/2021       Last Cardiology Tests:  ECG:  eCG today: NS< HR 64bpm    Echo:  3/30/2016  CONCLUSIONS:   1. The left ventricular systolic function is normal with a 60-65% ejection fraction.   2. The left atrium is normal in size.   3. There is moderate tricuspid regurgitation.   4. There is mild mitral and tricuspid regurgitation.   5. The estimated pulmonary artery pressure is mildly elevated with the RVSP at 37.5 mmHg.   6. There is a small pericardial effusion.     Cath:  3/31/2016  CONCLUSIONS:   1. No significant angiographic CAD in left dominant coronary circulation.   2. LVEDP 22mHg, no evidence of aortic stenosis on LV-Ao gradient.     3/4/2025 CAC  score:  IMPRESSION:  1. Coronary artery calcium score of 0*.  2. Small pericardial effusion most likely reactive/physiologic.  Please correlate with any concern for reactive/inflammatory  pericarditis.    Lab review: I have personally reviewed the laboratory result(s)   Diagnostic review: I have personally reviewed the result(s) of the Echocardiogram and [facility]     Assessment/Plan   Very pleasant 69 y.o. female from home with h/o generalized anxiety disorder, htn, presenting for follow up. She is doing very well from a cardiac standpoint.    Plan:  -Agree with starting atorvastatin 10mg daily for ASCVD risk reduction.   -Reduce amlodipine to 2.5mg daily d/t leg swelling on 5mg dosing, however, suspect LE edema is multifactorial due to chronic venous insufficiency coupled with hot weather as well as medication side effect.   -Continue valsartan 320mg daily       KAYKAY Figueroa-CNP         [1] No family history on file.

## 2025-09-05 DIAGNOSIS — E03.9 HYPOTHYROIDISM, UNSPECIFIED TYPE: ICD-10-CM

## 2025-09-05 RX ORDER — LEVOTHYROXINE SODIUM 50 UG/1
50 TABLET ORAL
Qty: 90 TABLET | Refills: 1 | Status: SHIPPED | OUTPATIENT
Start: 2025-09-05

## 2025-12-12 ENCOUNTER — APPOINTMENT (OUTPATIENT)
Dept: PRIMARY CARE | Facility: CLINIC | Age: 70
End: 2025-12-12
Payer: MEDICARE